# Patient Record
Sex: MALE | Race: BLACK OR AFRICAN AMERICAN | Employment: OTHER | URBAN - METROPOLITAN AREA
[De-identification: names, ages, dates, MRNs, and addresses within clinical notes are randomized per-mention and may not be internally consistent; named-entity substitution may affect disease eponyms.]

---

## 2021-01-04 ENCOUNTER — HOSPITAL ENCOUNTER (OUTPATIENT)
Dept: GENERAL RADIOLOGY | Age: 31
Discharge: HOME | End: 2021-01-04
Payer: MEDICAID

## 2021-01-04 DIAGNOSIS — R52 PAIN: ICD-10-CM

## 2021-01-04 PROCEDURE — 73562 X-RAY EXAM OF KNEE 3: CPT

## 2021-10-05 ENCOUNTER — HOSPITAL ENCOUNTER (OUTPATIENT)
Dept: GENERAL RADIOLOGY | Age: 31
Discharge: HOME | End: 2021-10-05
Payer: MEDICAID

## 2021-10-05 DIAGNOSIS — G89.29 CHRONIC THUMB PAIN, LEFT: ICD-10-CM

## 2021-10-05 DIAGNOSIS — M79.645 CHRONIC THUMB PAIN, LEFT: ICD-10-CM

## 2021-10-05 PROCEDURE — 73140 X-RAY EXAM OF FINGER(S): CPT

## 2023-12-04 ENCOUNTER — TELEPHONE (OUTPATIENT)
Age: 33
End: 2023-12-04

## 2023-12-04 NOTE — TELEPHONE ENCOUNTER
Today I called the patient, The patient is interested in the bariatric program.    Per Dr. Shah due to the patient being over 500 lbs. Dr. Shah advised to schedule the patient with the NP. In the [facility].  The patient must reach a weight of about 450 to be en-rolled in the [facility]. He was driving and the patient requested to call back later.  He was driving.     Loretta

## 2023-12-06 ENCOUNTER — TELEPHONE (OUTPATIENT)
Age: 33
End: 2023-12-06

## 2023-12-06 NOTE — TELEPHONE ENCOUNTER
This was my second attempt to contact the patient.  Per Dr.. Shah to schedule a consult  for [facility] with NP.  And then surgical path.    Loretta

## 2023-12-11 ENCOUNTER — OFFICE VISIT (OUTPATIENT)
Age: 33
End: 2023-12-11
Payer: MEDICAID

## 2023-12-11 VITALS
RESPIRATION RATE: 14 BRPM | OXYGEN SATURATION: 98 % | TEMPERATURE: 97.3 F | SYSTOLIC BLOOD PRESSURE: 138 MMHG | DIASTOLIC BLOOD PRESSURE: 82 MMHG | HEIGHT: 73 IN | WEIGHT: 315 LBS | HEART RATE: 105 BPM | BODY MASS INDEX: 41.75 KG/M2

## 2023-12-11 DIAGNOSIS — Z71.3 ENCOUNTER FOR WEIGHT LOSS COUNSELING: ICD-10-CM

## 2023-12-11 DIAGNOSIS — E78.5 HYPERLIPIDEMIA, UNSPECIFIED HYPERLIPIDEMIA TYPE: ICD-10-CM

## 2023-12-11 DIAGNOSIS — E66.01 MORBID OBESITY (HCC): Primary | ICD-10-CM

## 2023-12-11 DIAGNOSIS — R73.03 PREDIABETES: ICD-10-CM

## 2023-12-11 DIAGNOSIS — I10 ESSENTIAL HYPERTENSION: ICD-10-CM

## 2023-12-11 PROCEDURE — 99204 OFFICE O/P NEW MOD 45 MIN: CPT | Performed by: NURSE PRACTITIONER

## 2023-12-11 PROCEDURE — 3074F SYST BP LT 130 MM HG: CPT | Performed by: NURSE PRACTITIONER

## 2023-12-11 PROCEDURE — 3078F DIAST BP <80 MM HG: CPT | Performed by: NURSE PRACTITIONER

## 2023-12-11 RX ORDER — LISINOPRIL AND HYDROCHLOROTHIAZIDE 25; 20 MG/1; MG/1
1 TABLET ORAL DAILY
COMMUNITY
Start: 2023-11-07

## 2023-12-11 RX ORDER — ATORVASTATIN CALCIUM 10 MG/1
10 TABLET, FILM COATED ORAL DAILY
COMMUNITY
Start: 2023-11-07

## 2023-12-11 RX ORDER — LISINOPRIL 10 MG/1
10 TABLET ORAL DAILY
COMMUNITY
Start: 2020-06-24 | End: 2023-12-11

## 2023-12-11 RX ORDER — AMLODIPINE BESYLATE 10 MG/1
10 TABLET ORAL DAILY
COMMUNITY
Start: 2023-11-07

## 2023-12-11 NOTE — PROGRESS NOTES
Weight Loss Progress Note: Initial Physician Visit      Jocelyne Burden is a 35 y.o. male with a BMI of  77  who is here for his Initial Evaluation for medical bariatric care. Accompanied by wife to visit today. CC: Weight Management    Weight History  Current weight 501   Goal weight: no specific number in mind, would like to overall improve health  Highest weight 535     Food intolerances (gas, bloating, diarrhea, vomiting)? none      How many meals per day? 2-3    Usual meal times? Varies due to schedule  Skipped meals? Yes    Which meals are skipped? Breakfast     How often? Most days      Who cooks/makes your meals? Myself and wife    Who shops/buys your food? Myself and wife    Diet Recall:  No specific breakfast but tries to eat light, may have fruit between 7-9am  11-2 pm protein (salmon or meat alternative) with veg  6-9 pm same as lunch  S: apple, yogurt,  nuts, crackers, granola bar, occ chips  Coffee with sugar free creamer    Tries to focus on more vegetarian diet, will eat fish and eggs. Weight loss History  How many weight loss attempts have you had? OTC pill (unknown name) vegetarian diet  Which program were you most successful doing? Vegetarian diet    Significant Medical History    Have you ever taken appetite suppressants? yes   If yes: Rx or OTC? OTC   If yes; Any negative side effects? 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 Son will graduate in 2025, also trying for baby  Any potential unsupportive people: no  Why are you starting a weight loss program now? Trying for baby, fertility specialist recommended weight loss due to low morphology on semen analysis  Are you ready?

## 2023-12-12 ASSESSMENT — ENCOUNTER SYMPTOMS
NAUSEA: 0
ABDOMINAL PAIN: 0
CONSTIPATION: 0
SHORTNESS OF BREATH: 0
COUGH: 0
DIARRHEA: 0
VOMITING: 0

## 2024-01-26 ENCOUNTER — OFFICE VISIT (OUTPATIENT)
Age: 34
End: 2024-01-26
Payer: MEDICAID

## 2024-01-26 VITALS
WEIGHT: 315 LBS | SYSTOLIC BLOOD PRESSURE: 124 MMHG | HEART RATE: 102 BPM | HEIGHT: 73 IN | OXYGEN SATURATION: 94 % | TEMPERATURE: 97.6 F | DIASTOLIC BLOOD PRESSURE: 80 MMHG | RESPIRATION RATE: 17 BRPM | BODY MASS INDEX: 41.75 KG/M2

## 2024-01-26 DIAGNOSIS — E66.01 MORBID OBESITY (HCC): Primary | ICD-10-CM

## 2024-01-26 DIAGNOSIS — Z71.3 ENCOUNTER FOR WEIGHT LOSS COUNSELING: ICD-10-CM

## 2024-01-26 DIAGNOSIS — Z71.3 ENCOUNTER FOR DIETARY COUNSELING AND SURVEILLANCE: ICD-10-CM

## 2024-01-26 PROCEDURE — 99213 OFFICE O/P EST LOW 20 MIN: CPT | Performed by: NURSE PRACTITIONER

## 2024-01-26 RX ORDER — TIRZEPATIDE 2.5 MG/.5ML
2.5 INJECTION, SOLUTION SUBCUTANEOUS
Qty: 2 ML | Refills: 0 | Status: SHIPPED | OUTPATIENT
Start: 2024-01-26

## 2024-01-26 RX ORDER — TIRZEPATIDE 5 MG/.5ML
5 INJECTION, SOLUTION SUBCUTANEOUS
Qty: 2 ML | Refills: 0 | Status: SHIPPED | OUTPATIENT
Start: 2024-01-26

## 2024-01-26 NOTE — PROGRESS NOTES
[facility] Progress Note:   F/up Provider Visit    CC: Weight Management    Javon Callier is a 33 y.o. male who is here for his  f/up medical provider visit for the grocery LCD program.     Starting weight: 501lb  Weight last visit: 501lb  Weight today: 499lb    Arm: 19  Waist: 62.5  Neck 19  Body Fat Percentage: 46.3%    Down 2 lbs since last visit. Down 2 lbs since starting program in December 2023.    Increased walking since last visit as well as has been meal prepping and increased intake of poultry/fish.  Has also incorporated intermittent fasting, window from 12 PM to 8 PM however will take meds with some juice or small amount of food early morning.  Would like to incorporate an anti-obesity medication to further assist weight loss on top of diet and exercise.    Diet Recall:   D: fig bar with meds  L: Tomato soup with garlic cheese sticks  D: Chicken Tuscany  S: rice cakes    Did you have any problems adhering to the program since last visit? no  If yes, please explain: n/a    Since your last visit, have you experienced any complications? no    Have you received any other medical care since last visit? no  If yes, where and for what? N/a    Have you had any change in your medications since your last visit? no  If yes what? N/a    BP Readings from Last 3 Encounters:   01/26/24 124/80   12/11/23 138/82        Eating Habits Over Last Week:  Did you take in 64 oz of non-caloric fluids? yes     Did you consume your prescribed meal replacement regimen each day? N/a       Physical Activity Over the Past Week:    Aerobic exercise: Walking 2-3 x weekly  Resistance exercise: 0 workouts / week      Weight History      1/26/2024    11:03 AM 12/11/2023    11:44 AM   Weight Metrics   Weight 499 lb 8 oz 501 lb 1.6 oz   BMI (Calculated) 66 kg/m2 66.3 kg/m2         Starting wt: 501  Goal wt: Improve health  EKG due: 451  Ideal body weight: 79.9 kg (176 lb 2.4 oz)  Adjusted ideal body weight: 138.6 kg

## 2024-01-31 ENCOUNTER — CLINICAL DOCUMENTATION (OUTPATIENT)
Age: 34
End: 2024-01-31

## 2024-01-31 ASSESSMENT — ENCOUNTER SYMPTOMS
SHORTNESS OF BREATH: 0
CONSTIPATION: 0
ABDOMINAL PAIN: 0
DIARRHEA: 0
NAUSEA: 0
COUGH: 0
VOMITING: 0

## 2024-02-06 ENCOUNTER — CLINICAL DOCUMENTATION (OUTPATIENT)
Age: 34
End: 2024-02-06

## 2024-02-07 ENCOUNTER — TELEPHONE (OUTPATIENT)
Age: 34
End: 2024-02-07

## 2024-02-21 DIAGNOSIS — E66.01 MORBID OBESITY (HCC): ICD-10-CM

## 2024-02-21 RX ORDER — TIRZEPATIDE 5 MG/.5ML
5 INJECTION, SOLUTION SUBCUTANEOUS
Qty: 2 ML | Refills: 0 | Status: CANCELLED | OUTPATIENT
Start: 2024-02-21

## 2024-02-21 RX ORDER — TIRZEPATIDE 2.5 MG/.5ML
2.5 INJECTION, SOLUTION SUBCUTANEOUS
Qty: 2 ML | Refills: 0 | Status: CANCELLED | OUTPATIENT
Start: 2024-02-21

## 2024-03-20 ENCOUNTER — OFFICE VISIT (OUTPATIENT)
Age: 34
End: 2024-03-20
Payer: MEDICAID

## 2024-03-20 VITALS — RESPIRATION RATE: 20 BRPM | HEIGHT: 74 IN | WEIGHT: 315 LBS | BODY MASS INDEX: 40.43 KG/M2

## 2024-03-20 DIAGNOSIS — Z79.899 FOLLOW-UP ENCOUNTER INVOLVING MEDICATION: ICD-10-CM

## 2024-03-20 DIAGNOSIS — Z71.3 ENCOUNTER FOR DIETARY COUNSELING AND SURVEILLANCE: ICD-10-CM

## 2024-03-20 DIAGNOSIS — E66.01 MORBID OBESITY (HCC): Primary | ICD-10-CM

## 2024-03-20 DIAGNOSIS — Z71.3 ENCOUNTER FOR WEIGHT LOSS COUNSELING: ICD-10-CM

## 2024-03-20 PROCEDURE — 99213 OFFICE O/P EST LOW 20 MIN: CPT | Performed by: NURSE PRACTITIONER

## 2024-03-20 NOTE — PROGRESS NOTES
Objective  Vitals:    03/20/24 1110   Resp: 20   Weight: (!) 219 kg (482 lb 11.2 oz)   Height: 1.88 m (6' 2\")      No LMP for male patient.    Physical Exam  Physical Exam  Vitals and nursing note reviewed.   Constitutional:       Appearance: He is obese.   HENT:      Head: Normocephalic and atraumatic.   Pulmonary:      Effort: Pulmonary effort is normal.   Musculoskeletal:         General: Normal range of motion.   Neurological:      General: No focal deficit present.      Mental Status: He is alert and oriented to person, place, and time.   Psychiatric:         Mood and Affect: Mood normal.         Behavior: Behavior normal.         No results found for this or any previous visit (from the past 672 hour(s)).    Assessment / Plan  Encounter Diagnoses   Name Primary?    Morbid obesity (HCC) Yes    BMI 60.0-69.9, adult (HCC)     Encounter for weight loss counseling     Encounter for dietary counseling and surveillance     Follow-up encounter involving medication        1.  Weight management      Today, the patient is  Height: 188 cm (6' 2\") tall, Weight - Scale: (!) 219 kg (482 lb 11.2 oz) lbs for a Body mass index is 61.98 kg/m².  is suffering from morbid obesity which is further complicated by Type 2 Diabetes Mellitus, Hypertension, and Hyperlipidemia     Evaluation of progress: Fantastic progress with incorporation of Zepbound.  Will increase to 5 mg dose later this week.  He will let me know when he is ready for next dose and will send in 7.5 at that time.  Continue grocery LCD and increase activity as tolerated.     Goal(s) for next appointment:   -8 lbs    I have reviewed/discussed the above normal BMI with the patient.  I have recommended the following interventions: dietary management education, guidance, and counseling, encourage exercise, and monitor weight .         The primary encounter diagnosis was Morbid obesity (HCC). Diagnoses of BMI 60.0-69.9, adult (HCC), Encounter for weight loss

## 2024-03-25 ASSESSMENT — ENCOUNTER SYMPTOMS
VOMITING: 0
SHORTNESS OF BREATH: 0
COUGH: 0
CONSTIPATION: 0
NAUSEA: 0
ABDOMINAL PAIN: 0
DIARRHEA: 0

## 2024-04-12 ENCOUNTER — TELEPHONE (OUTPATIENT)
Age: 34
End: 2024-04-12

## 2024-04-12 NOTE — TELEPHONE ENCOUNTER
Patient left a message on the nurse triage line stating he needs a refill of Zepbound. I called the patient to verify - he took his last injection of 5mg today and is ready to move up to 7.5mg. Confirmed pharmacy is [facility] [address] [place]gal[place].

## 2024-04-19 ENCOUNTER — TELEPHONE (OUTPATIENT)
Age: 34
End: 2024-04-19

## 2024-04-19 NOTE — TELEPHONE ENCOUNTER
Patient calling due to his Zepbound rx being on backorder. He states that the nurse told him she would do a PA and he is checking in on this. Also, he is asking if there is anything he needs to do in the meantime.

## 2024-04-22 ENCOUNTER — TELEPHONE (OUTPATIENT)
Age: 34
End: 2024-04-22

## 2024-04-22 NOTE — TELEPHONE ENCOUNTER
Patient made aware of his prior auth and to continue to heck with his phamrmacy also information regarding mail order provided

## 2024-04-26 ENCOUNTER — TELEPHONE (OUTPATIENT)
Age: 34
End: 2024-04-26

## 2024-05-02 ENCOUNTER — OFFICE VISIT (OUTPATIENT)
Age: 34
End: 2024-05-02
Payer: MEDICAID

## 2024-05-02 VITALS
WEIGHT: 315 LBS | TEMPERATURE: 97.6 F | RESPIRATION RATE: 17 BRPM | OXYGEN SATURATION: 99 % | DIASTOLIC BLOOD PRESSURE: 57 MMHG | HEART RATE: 85 BPM | SYSTOLIC BLOOD PRESSURE: 118 MMHG | BODY MASS INDEX: 40.43 KG/M2 | HEIGHT: 74 IN

## 2024-05-02 DIAGNOSIS — Z71.3 ENCOUNTER FOR DIETARY COUNSELING AND SURVEILLANCE: ICD-10-CM

## 2024-05-02 DIAGNOSIS — E66.01 MORBID OBESITY (HCC): Primary | ICD-10-CM

## 2024-05-02 DIAGNOSIS — Z71.3 ENCOUNTER FOR WEIGHT LOSS COUNSELING: ICD-10-CM

## 2024-05-02 DIAGNOSIS — Z79.899 FOLLOW-UP ENCOUNTER INVOLVING MEDICATION: ICD-10-CM

## 2024-05-02 PROCEDURE — 99213 OFFICE O/P EST LOW 20 MIN: CPT | Performed by: NURSE PRACTITIONER

## 2024-05-02 RX ORDER — TIRZEPATIDE 10 MG/.5ML
10 INJECTION, SOLUTION SUBCUTANEOUS
Qty: 2 ML | Refills: 1 | Status: SHIPPED | OUTPATIENT
Start: 2024-05-02

## 2024-05-02 RX ORDER — TIRZEPATIDE 7.5 MG/.5ML
7.5 INJECTION, SOLUTION SUBCUTANEOUS
Qty: 2 ML | Refills: 1 | Status: SHIPPED | OUTPATIENT
Start: 2024-05-02

## 2024-05-02 RX ORDER — TIRZEPATIDE 7.5 MG/.5ML
7.5 INJECTION, SOLUTION SUBCUTANEOUS
Qty: 2 ML | Refills: 1 | Status: SHIPPED | OUTPATIENT
Start: 2024-05-02 | End: 2024-05-02 | Stop reason: SDUPTHER

## 2024-05-02 RX ORDER — TIRZEPATIDE 5 MG/.5ML
5 INJECTION, SOLUTION SUBCUTANEOUS
Qty: 2 ML | Refills: 1 | Status: SHIPPED | OUTPATIENT
Start: 2024-05-02 | End: 2024-05-02 | Stop reason: SDUPTHER

## 2024-05-02 RX ORDER — TIRZEPATIDE 5 MG/.5ML
5 INJECTION, SOLUTION SUBCUTANEOUS
Qty: 2 ML | Refills: 1 | Status: SHIPPED | OUTPATIENT
Start: 2024-05-02

## 2024-05-02 ASSESSMENT — ENCOUNTER SYMPTOMS
CONSTIPATION: 0
COUGH: 0
SHORTNESS OF BREATH: 0
ABDOMINAL PAIN: 0
NAUSEA: 0
VOMITING: 0
DIARRHEA: 0

## 2024-05-02 NOTE — PROGRESS NOTES
[facility] Nurse Note:       CC: Weight Management    Javon Callier is a 34 y.o. male who is here for his f/up medical provider visit for the Medication program.       Did you have any problems adhering to the program since last visit? No  If yes, please explain:     Since your last visit, have you experienced any complications? No    Have you received any other medical care since last visit? No  If yes, where and for what?     Have you had any change in your medications since your last visit? No If yes what?     Are you taking an anti-obesity medication? No If yes what and are you experiencing any side effects? Has not had any Zepbound for about 3 weeks due to national backorder.    Would you still like to continue your current medication and dosage? Yes    BP Readings from Last 3 Encounters:   05/02/24 (!) 118/57   01/26/24 124/80   12/11/23 138/82        Eating Habits Over Last Week:    How many oz of sugar-free fluids are you consuming? 120oz daily.     Did you consume your prescribed meal replacement regimen each day? Yes    Physical Activity Routine:    Aerobic exercise: Coaching winter    Resistance exercise: Strength training 3-4 days weekly x 60 mins

## 2024-05-02 NOTE — PROGRESS NOTES
[facility] Progress Note:   F/up Provider Visit    CC: Weight Management    History of Present Illness  The patient is a 34-year-old male presenting for weight management and medication follow-up. He has been taking Zepbound.    The patient's last administration of Zepbound was on 04/05/2024, following a 4-week regimen of 5 mg. He reports no adverse effects from the medication. There have been no alterations in his medication regimen or health status since his last visit. He reported a significant increase in appetite earlier this week, but otherwise, his dietary habits have been satisfactory. His diet consists of two meals per day, primarily consisting of meat and vegetables, and he continues to adhere to an intermittent fasting diet. His current weight is 465 pounds, down 17 pounds since 03/2024, and he reports a weight loss of 7 inches of his waist.        3/25/2024     8:42 AM 5/2/2024    10:58 AM   Non-Surgical Weight Loss Tracker   Consult Date 12/11/2023    Initial Height 6' 2\"    Initial Weight 501 lb    Ideal Body Weight 171 lb    Initial BMI 64.32    Initial Body Fat % 77.18     lb    Date 3/20/2024 5/2/2024   Height 6' 2\" 6' 2\"   Weight 482 lb 465 lb   BMI 61.88 59.7   Weight Change 482 lb -36 lb   Total Weight Change 0 lb -36 lb   % EBWL <UNK>% 11%   Subsequent Body Fat % 74.26 71.64   Body Fat % Change 74.26 -2.62   General Comments  Zepbound        Arm: 18  Waist: 55  Neck: 19.5  Body Fat Percentage: 44.5    Body composition scanned to media    Down 17 lbs since last visit. Down 36 lbs since starting program on 12/11/2023 .    Goal wt: improve health  EKG due: 451  Ideal body weight: 82.2 kg (181 lb 3.5 oz)  Adjusted ideal body weight: 133.7 kg (294 lb 11.7 oz)  Body mass index is 59.7 kg/m².    History    Past Medical History:   Diagnosis Date    Hyperlipidemia     Hypertension     Type 2 diabetes mellitus without complication (HCC)        No past surgical history on

## 2024-06-27 DIAGNOSIS — E66.01 MORBID OBESITY (HCC): ICD-10-CM

## 2024-06-28 ENCOUNTER — OFFICE VISIT (OUTPATIENT)
Age: 34
End: 2024-06-28
Payer: MEDICAID

## 2024-06-28 VITALS
HEIGHT: 74 IN | SYSTOLIC BLOOD PRESSURE: 126 MMHG | OXYGEN SATURATION: 99 % | BODY MASS INDEX: 40.43 KG/M2 | RESPIRATION RATE: 16 BRPM | WEIGHT: 315 LBS | HEART RATE: 85 BPM | TEMPERATURE: 98 F | DIASTOLIC BLOOD PRESSURE: 77 MMHG

## 2024-06-28 DIAGNOSIS — E66.01 MORBID OBESITY (HCC): Primary | ICD-10-CM

## 2024-06-28 DIAGNOSIS — Z79.899 FOLLOW-UP ENCOUNTER INVOLVING MEDICATION: ICD-10-CM

## 2024-06-28 DIAGNOSIS — Z71.3 ENCOUNTER FOR WEIGHT LOSS COUNSELING: ICD-10-CM

## 2024-06-28 PROCEDURE — 99213 OFFICE O/P EST LOW 20 MIN: CPT | Performed by: NURSE PRACTITIONER

## 2024-06-28 RX ORDER — TIRZEPATIDE 12.5 MG/.5ML
12.5 INJECTION, SOLUTION SUBCUTANEOUS
Qty: 2 ML | Refills: 1 | Status: SHIPPED | OUTPATIENT
Start: 2024-06-28

## 2024-06-28 RX ORDER — TIRZEPATIDE 12.5 MG/.5ML
12.5 INJECTION, SOLUTION SUBCUTANEOUS
Qty: 2 ML | Refills: 1 | Status: SHIPPED | OUTPATIENT
Start: 2024-06-28 | End: 2024-06-28 | Stop reason: SDUPTHER

## 2024-07-03 RX ORDER — TIRZEPATIDE 7.5 MG/.5ML
INJECTION, SOLUTION SUBCUTANEOUS
Qty: 2 ML | Refills: 0 | OUTPATIENT
Start: 2024-07-03

## 2024-07-27 DIAGNOSIS — E66.01 MORBID OBESITY (HCC): ICD-10-CM

## 2024-08-16 RX ORDER — TIRZEPATIDE 12.5 MG/.5ML
12.5 INJECTION, SOLUTION SUBCUTANEOUS
Qty: 2 ML | Refills: 1 | Status: SHIPPED | OUTPATIENT
Start: 2024-08-16

## 2024-09-06 ENCOUNTER — OFFICE VISIT (OUTPATIENT)
Age: 34
End: 2024-09-06
Payer: MEDICAID

## 2024-09-06 VITALS
DIASTOLIC BLOOD PRESSURE: 97 MMHG | BODY MASS INDEX: 40.43 KG/M2 | HEART RATE: 104 BPM | TEMPERATURE: 97.9 F | WEIGHT: 315 LBS | OXYGEN SATURATION: 99 % | HEIGHT: 74 IN | SYSTOLIC BLOOD PRESSURE: 118 MMHG | RESPIRATION RATE: 18 BRPM

## 2024-09-06 DIAGNOSIS — Z79.899 FOLLOW-UP ENCOUNTER INVOLVING MEDICATION: ICD-10-CM

## 2024-09-06 DIAGNOSIS — E66.01 CLASS 3 SEVERE OBESITY WITH SERIOUS COMORBIDITY AND BODY MASS INDEX (BMI) OF 50.0 TO 59.9 IN ADULT, UNSPECIFIED OBESITY TYPE (HCC): Primary | ICD-10-CM

## 2024-09-06 DIAGNOSIS — Z71.3 ENCOUNTER FOR WEIGHT LOSS COUNSELING: ICD-10-CM

## 2024-09-06 PROCEDURE — 99213 OFFICE O/P EST LOW 20 MIN: CPT | Performed by: NURSE PRACTITIONER

## 2024-09-06 ASSESSMENT — ENCOUNTER SYMPTOMS
GASTROINTESTINAL NEGATIVE: 1
RESPIRATORY NEGATIVE: 1

## 2024-09-06 NOTE — PROGRESS NOTES
[facility] Nurse Note:       CC: Weight Management    Javon Callier is a 34 y.o. male who is here for his f/up medical provider visit for the Medication program.       Did you have any problems adhering to the program since last visit? No  If yes, please explain:     Since your last visit, have you experienced any complications? No    Have you received any other medical care since last visit? No  If yes, where and for what?     Have you had any change in your medications since your last visit? No If yes what?     Are you taking an anti-obesity medication? Yes If yes what and are you experiencing any side effects? Zepbound 12.5mg with no side effects.    Would you still like to continue your current medication and dosage? Yes.    BP Readings from Last 3 Encounters:   06/28/24 126/77   05/02/24 (!) 118/57   01/26/24 124/80        Eating Habits Over Last Week:    How many oz of sugar-free fluids are you consuming? 120oz daily.    Did you consume your prescribed meal replacement regimen each day? 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

## 2024-09-06 NOTE — PROGRESS NOTES
[facility] Progress Note:   F/up Provider Visit    CC: Weight Management    Javon Callier is a 34 y.o. male who is here for his f/up medical provider visit for the Medication program. He is currently prescribed Zepbound 12.5 mg.    Mr. Rodriguez states that he is doing well. He reports that he is no longer having headaches and takes his Metformin once every morning. He has not had any side effects from the increase in dosage of Zepbound 10 mg to 12.5 mg.    He is down 61 lbs since starting Zepbound which is 12% of his starting weight of 499 lbs.        3/25/2024     8:42 AM 5/2/2024    10:58 AM 6/28/2024    11:00 AM 9/6/2024     2:11 PM   Non-Surgical Weight Loss Tracker   Consult Date 12/11/2023      Initial Height 6' 2\"      Initial Weight 501 lb      Ideal Body Weight 171 lb      Initial BMI 64.32      Initial Body Fat % 77.18       lb      Date 3/20/2024 5/2/2024 6/28/2024 9/6/2024   Height 6' 2\" 6' 2\" 6' 2\" 6' 2\"   Weight 482 lb 465 lb 468 lb 440 lb   BMI 61.88 59.7 60.08 56.49   Weight Change 482 lb -36 lb 3 lb -28 lb   Total Weight Change 0 lb -36 lb -33 lb -61 lb   % EBWL <UNK>% 11% 10% 18%   Subsequent Body Fat % 74.26 71.64 72.1 67.79   Body Fat % Change 74.26 -2.62 0.46 -4.31   Subseq. Anti-obesity Meds --      General Comments  Zepbound Zepbound 7.5 mg Zepbound 10mg        Arm: 17  Waist: 54  Neck: 19  Body Fat Percentage: 43.4%    Body composition scanned to media    Down 28 lbs and 7 inches since last visit. Down 61 lbs since starting program on 12/11/2023 .    Goal wt: improve health  Ideal body weight: 82.2 kg (181 lb 3.5 oz)  Adjusted ideal body weight: 129.2 kg (284 lb 11.7 oz)  Body mass index is 56.49 kg/m².    History    Past Medical History:   Diagnosis Date    Hyperlipidemia     Hypertension     Type 2 diabetes mellitus without complication (HCC)        No past surgical history on file.    Current Outpatient Medications   Medication Sig Dispense Refill

## 2024-09-18 DIAGNOSIS — E66.01 MORBID OBESITY (HCC): ICD-10-CM

## 2024-09-19 RX ORDER — TIRZEPATIDE 12.5 MG/.5ML
12.5 INJECTION, SOLUTION SUBCUTANEOUS
Qty: 2 ML | Refills: 0 | Status: SHIPPED | OUTPATIENT
Start: 2024-09-19

## 2024-10-12 DIAGNOSIS — E66.01 MORBID OBESITY: ICD-10-CM

## 2024-10-14 RX ORDER — TIRZEPATIDE 12.5 MG/.5ML
12.5 INJECTION, SOLUTION SUBCUTANEOUS
Qty: 2 ML | Refills: 1 | Status: SHIPPED | OUTPATIENT
Start: 2024-10-14

## 2024-10-30 ENCOUNTER — OFFICE VISIT (OUTPATIENT)
Age: 34
End: 2024-10-30
Payer: MEDICAID

## 2024-10-30 VITALS
HEART RATE: 85 BPM | RESPIRATION RATE: 18 BRPM | WEIGHT: 315 LBS | TEMPERATURE: 97.6 F | HEIGHT: 74 IN | SYSTOLIC BLOOD PRESSURE: 139 MMHG | OXYGEN SATURATION: 98 % | BODY MASS INDEX: 40.43 KG/M2 | DIASTOLIC BLOOD PRESSURE: 93 MMHG

## 2024-10-30 DIAGNOSIS — Z71.3 ENCOUNTER FOR WEIGHT LOSS COUNSELING: ICD-10-CM

## 2024-10-30 DIAGNOSIS — E66.01 CLASS 3 SEVERE OBESITY WITH SERIOUS COMORBIDITY AND BODY MASS INDEX (BMI) OF 50.0 TO 59.9 IN ADULT, UNSPECIFIED OBESITY TYPE: Primary | ICD-10-CM

## 2024-10-30 DIAGNOSIS — E66.813 CLASS 3 SEVERE OBESITY WITH SERIOUS COMORBIDITY AND BODY MASS INDEX (BMI) OF 50.0 TO 59.9 IN ADULT, UNSPECIFIED OBESITY TYPE: Primary | ICD-10-CM

## 2024-10-30 DIAGNOSIS — Z79.899 FOLLOW-UP ENCOUNTER INVOLVING MEDICATION: ICD-10-CM

## 2024-10-30 PROCEDURE — 99214 OFFICE O/P EST MOD 30 MIN: CPT | Performed by: NURSE PRACTITIONER

## 2024-10-30 RX ORDER — TIRZEPATIDE 15 MG/.5ML
15 INJECTION, SOLUTION SUBCUTANEOUS
Qty: 6 ML | Refills: 1 | Status: SHIPPED | OUTPATIENT
Start: 2024-10-30

## 2024-10-30 ASSESSMENT — ENCOUNTER SYMPTOMS
COUGH: 0
NAUSEA: 0
CONSTIPATION: 0
SHORTNESS OF BREATH: 0
DIARRHEA: 0
VOMITING: 0
ABDOMINAL PAIN: 0

## 2024-10-30 NOTE — PROGRESS NOTES
[facility] Nurse Note:       CC: Weight Management    Javon Callier is a 34 y.o. male who is here for his f/up medical provider visit for the Medication program.       Did you have any problems adhering to the program since last visit? No  If yes, please explain:     Since your last visit, have you experienced any complications? No    Have you received any other medical care since last visit? No  If yes, where and for what?     Have you had any change in your medications since your last visit? No If yes what?     Are you taking an anti-obesity medication? Yes If yes what and are you experiencing any side effects? Zepbound 12.5mg with no side effects.    Would you still like to continue your current medication and dosage? Yes.    BP Readings from Last 3 Encounters:   09/06/24 (!) 118/97   06/28/24 126/77   05/02/24 (!) 118/57        Eating Habits Over Last Week:    How many oz of sugar-free fluids are you consuming? 120oz daily.    Did you consume your prescribed meal replacement regimen each day? 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

## 2024-10-30 NOTE — PROGRESS NOTES
[facility] Progress Note:   F/up Provider Visit    CC: Weight Management    History of Present Illness  The patient is a 34-year-old male presenting for weight management. He is currently on Zepbound 12.5 mg.    He reports satisfactory progress with the current dosage of Zepbound 12.5 mg and would like to go up to the 15 mg dose. His blood sugar levels have remained stable, with no instances of hypoglycemia.     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 His exercise routine is consistent, although he did not sleep well the previous night due to a late nap.     His diet is somewhat irregular, often eating only when hungry or late in the afternoon. He typically consumes a protein drink in the morning and focuses on low-carb meals for the rest of the day.     He is down 64 lbs since starting Zepbound which is 13% of his starting weight of 499 lbs.         3/25/2024     8:42 AM 5/2/2024    10:58 AM 6/28/2024    11:00 AM 9/6/2024     2:11 PM 10/30/2024    10:07 AM   Non-Surgical Weight Loss Tracker   Consult Date 12/11/2023       Initial Height 6' 2\"       Initial Weight 501 lb       Ideal Body Weight 171 lb       Initial BMI 64.32       Initial Body Fat % 77.18        lb       Date 3/20/2024 5/2/2024 6/28/2024 9/6/2024 10/30/2024   Height 6' 2\" 6' 2\" 6' 2\" 6' 2\" 6' 2\"   Weight 482 lb 465 lb 468 lb 440 lb 435 lb   BMI 61.88 59.7 60.08 56.49 55.84   Weight Change 482 lb -36 lb 3 lb -28 lb -5 lb   Total Weight Change 0 lb -36 lb -33 lb -61 lb -66 lb   % EBWL <UNK>% 11% 10% 18% 20%   Subsequent Body Fat % 74.26 71.64 72.1 67.79 67.01   Body Fat % Change 74.26 -2.62 0.46 -4.31 -0.78   Subseq. Anti-obesity Meds --       General Comments  Zepbound Zepbound 7.5 mg Zepbound 10mg Zepbound 12.5mg        Arm: 15.5  Waist: 53.5  Neck: 19  Body Fat Percentage: 43.1    Body composition scanned to media    Down 5 lbs and 2 inches since last visit. Down 66 lbs

## 2025-01-27 ENCOUNTER — OFFICE VISIT (OUTPATIENT)
Age: 35
End: 2025-01-27
Payer: MEDICAID

## 2025-01-27 VITALS
DIASTOLIC BLOOD PRESSURE: 91 MMHG | BODY MASS INDEX: 40.43 KG/M2 | HEART RATE: 90 BPM | OXYGEN SATURATION: 98 % | HEIGHT: 74 IN | WEIGHT: 315 LBS | RESPIRATION RATE: 18 BRPM | TEMPERATURE: 97.6 F | SYSTOLIC BLOOD PRESSURE: 137 MMHG

## 2025-01-27 DIAGNOSIS — Z71.3 ENCOUNTER FOR WEIGHT LOSS COUNSELING: ICD-10-CM

## 2025-01-27 DIAGNOSIS — E66.01 MORBID OBESITY: Primary | ICD-10-CM

## 2025-01-27 DIAGNOSIS — Z79.899 FOLLOW-UP ENCOUNTER INVOLVING MEDICATION: ICD-10-CM

## 2025-01-27 PROCEDURE — 99214 OFFICE O/P EST MOD 30 MIN: CPT | Performed by: NURSE PRACTITIONER

## 2025-01-27 NOTE — PROGRESS NOTES
[facility] Nurse Note:       CC: Weight Management    Javon Callier is a 34 y.o. male who is here for his f/up medical provider visit for the Medication program.       Did you have any problems adhering to the program since last visit? No  If yes, please explain:     Since your last visit, have you experienced any complications? No    Have you received any other medical care since last visit? No  If yes, where and for what?     Have you had any change in your medications since your last visit? No If yes what?     Are you taking an anti-obesity medication? Yes If yes what and are you experiencing any side effects? Zepbound 15mg with no side effects.    Would you still like to continue your current medication and dosage? Yes.    BP Readings from Last 3 Encounters:   10/30/24 (!) 139/93   09/06/24 (!) 118/97   06/28/24 126/77        Eating Habits Over Last Week:    How many oz of sugar-free fluids are you consuming? 100-120 oz daily.    Did you consume your prescribed meal replacement regimen each day? 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    Resistance exercise: Strength training 3-4 days 1.5 hours

## 2025-01-30 ASSESSMENT — ENCOUNTER SYMPTOMS
SHORTNESS OF BREATH: 0
NAUSEA: 0
CONSTIPATION: 0
DIARRHEA: 0
COUGH: 0
ABDOMINAL PAIN: 0
VOMITING: 0

## 2025-01-30 NOTE — PROGRESS NOTES
[facility] Progress Note:   F/up Provider Visit    CC: Weight Management    History of Present Illness  The patient is a 34-year-old male presenting for weight management. He is currently on Zepbound 15 mg.     He has achieved a significant weight loss of 100 pounds since he started with our program and a reduction of 34 pounds since his last visit in October 2024. He reports no adverse effects from the Zepbound 15 mg, which he finds effective. He has been engaging in fasting with his [employer] for approximately 2 weeks and has increased his strength training exercises. He ensures adequate protein intake after 6 PM. He has an upcoming annual appointment with his PCP on Wednesday which includes blood work. He reports stable blood sugar levels and satisfactory blood pressure readings at home, typically around 127/78.  He is curious about the possibility of weaning off the medication. He has been focusing on the mental aspect of weight loss, as he often feels hungry even after eating. He believes his discipline has improved, which has facilitated his weight loss journey. His goal weight is 280 pounds.    He is down 98 lbs since starting Zepbound which is 20% of his starting weight of 499 lbs.         3/25/2024     8:42 AM 5/2/2024    10:58 AM 6/28/2024    11:00 AM 9/6/2024     2:11 PM 10/30/2024    10:07 AM 1/27/2025     9:56 AM   Non-Surgical Weight Loss Tracker   Consult Date 12/11/2023        Initial Height 6' 2\"        Initial Weight 501 lb        Ideal Body Weight 171 lb        Initial BMI 64.32        Initial Body Fat % 77.18         lb        Date 3/20/2024 5/2/2024 6/28/2024 9/6/2024 10/30/2024 1/27/2025   Height 6' 2\" 6' 2\" 6' 2\" 6' 2\" 6' 2\" 6' 2\"   Weight 482 lb 465 lb 468 lb 440 lb 435 lb 401 lb   BMI 61.88 59.7 60.08 56.49 55.84 51.48   Weight Change 482 lb -36 lb 3 lb -28 lb -5 lb -34 lb   Total Weight Change 0 lb -36 lb -33 lb -61 lb -66 lb -100 lb   % EBWL <UNK>% 11% 10% 18%

## 2025-04-04 DIAGNOSIS — E66.813 CLASS 3 SEVERE OBESITY WITH SERIOUS COMORBIDITY AND BODY MASS INDEX (BMI) OF 50.0 TO 59.9 IN ADULT, UNSPECIFIED OBESITY TYPE: ICD-10-CM

## 2025-04-07 RX ORDER — TIRZEPATIDE 15 MG/.5ML
INJECTION, SOLUTION SUBCUTANEOUS
Qty: 6 ML | Refills: 0 | Status: SHIPPED | OUTPATIENT
Start: 2025-04-07

## 2025-05-23 ENCOUNTER — OFFICE VISIT (OUTPATIENT)
Age: 35
End: 2025-05-23
Payer: MEDICAID

## 2025-05-23 DIAGNOSIS — Z79.899 FOLLOW-UP ENCOUNTER INVOLVING MEDICATION: ICD-10-CM

## 2025-05-23 DIAGNOSIS — E66.01 MORBID OBESITY (HCC): Primary | ICD-10-CM

## 2025-05-23 DIAGNOSIS — Z71.3 ENCOUNTER FOR WEIGHT LOSS COUNSELING: ICD-10-CM

## 2025-05-23 PROCEDURE — 99214 OFFICE O/P EST MOD 30 MIN: CPT | Performed by: NURSE PRACTITIONER

## 2025-05-23 NOTE — PROGRESS NOTES
[facility] Progress Note:   F/up Provider Visit    CC: Weight Management    History of Present Illness  The patient is a 35-year-old male presenting for weight management. He is currently on Zepbound 15 mg.    In March and April, he was regularly attending the gym but experienced lightheadedness during workouts, which he believes was due to inadequate food intake. Prior to this, he was able to complete a full workout without eating. Currently, his exercise regimen is primarily cardio-based, with minimal weightlifting, and he has been increasing his protein intake due to previous muscle mass loss.     Over the past 3 weeks, he has been waking up hungry, a new development as he typically does not eat breakfast. He has been consuming a protein shake and snacking throughout the day. He has been avoiding fried foods and is seeking advice on the optimal protein intake. He has been alternating his injection sites between his stomach, arms, and thighs. He has found that injections in his stomach yield the best weight loss results. He has been struggling with increasing his water intake. He has been consuming [employer] protein drinks, which contain 42 g of protein per bottle, and [employer] yogurt, which contains 15 g of protein. He is considering the use of magnesium supplements for bone health and is seeking advice on the use of black seed oil and sea fuller for weight loss.    He had to redo his lab work with his PCP because his red blood cell count was elevated, which he thinks was from being dehydrated. When he redid it again, it dropped to a normal range.    He was on vitamin D for 3 months.    He is down 110 lbs since starting Zepbound which is 22% of his starting weight of 499 lbs.     Last Non-Surgical Weight Loss:      5/23/2025    12:57 PM 1/27/2025     9:56 AM 10/30/2024    10:07 AM 9/6/2024     2:11 PM 6/28/2024    11:00 AM 5/2/2024    10:58 AM 3/25/2024     8:42 AM   Non-Surgical Weight Loss

## 2025-05-23 NOTE — PROGRESS NOTES
Chief Complaint   Patient presents with    Follow-up     [facility] Nurse Note:       CC: Weight Management    Javon Callier is a 35 y.o. male who is here for his f/up medical provider visit for the Medication program.       Did you have any problems adhering to the program since last visit? No  If yes, please explain:     Since your last visit, have you experienced any complications? No    Have you received any other medical care since last visit? No  If yes, where and for what?     Have you had any change in your medications since your last visit? No If yes what?     Are you taking an anti-obesity medication? Yes If yes what and are you experiencing any side effects?  Hunger cravings    Would you still like to continue your current medication and dosage?  Discuss other meds    BP Readings from Last 3 Encounters:   01/27/25 (!) 137/91   10/30/24 (!) 139/93   09/06/24 (!) 118/97        Eating Habits Over Last Week:    How many oz of sugar-free fluids are you consuming? 64    Did you consume your prescribed meal replacement regimen each day? No    Physical Activity Routine:    Aerobic exercise: walking 5 days a week     Resistance exercise: walking 5 days a week

## 2025-05-30 VITALS
BODY MASS INDEX: 40.43 KG/M2 | SYSTOLIC BLOOD PRESSURE: 115 MMHG | HEIGHT: 74 IN | WEIGHT: 315 LBS | HEART RATE: 97 BPM | DIASTOLIC BLOOD PRESSURE: 82 MMHG

## 2025-05-30 ASSESSMENT — ENCOUNTER SYMPTOMS
NAUSEA: 0
ABDOMINAL PAIN: 0
CONSTIPATION: 0
SHORTNESS OF BREATH: 0
VOMITING: 0
DIARRHEA: 0
COUGH: 0

## 2025-06-23 DIAGNOSIS — E66.813 CLASS 3 SEVERE OBESITY WITH SERIOUS COMORBIDITY AND BODY MASS INDEX (BMI) OF 50.0 TO 59.9 IN ADULT, UNSPECIFIED OBESITY TYPE (HCC): ICD-10-CM

## 2025-06-27 RX ORDER — TIRZEPATIDE 15 MG/.5ML
INJECTION, SOLUTION SUBCUTANEOUS
Qty: 6 ML | Refills: 0 | Status: SHIPPED | OUTPATIENT
Start: 2025-06-27

## 2025-08-15 ENCOUNTER — PATIENT MESSAGE (OUTPATIENT)
Age: 35
End: 2025-08-15

## 2025-08-19 ENCOUNTER — TELEPHONE (OUTPATIENT)
Age: 35
End: 2025-08-19